# Patient Record
Sex: FEMALE | Race: WHITE
[De-identification: names, ages, dates, MRNs, and addresses within clinical notes are randomized per-mention and may not be internally consistent; named-entity substitution may affect disease eponyms.]

---

## 2018-03-26 ENCOUNTER — HOSPITAL ENCOUNTER (OUTPATIENT)
Dept: HOSPITAL 88 - RAD | Age: 43
End: 2018-03-26
Attending: INTERNAL MEDICINE
Payer: COMMERCIAL

## 2018-03-26 DIAGNOSIS — M25.572: Primary | ICD-10-CM

## 2018-03-26 NOTE — DIAGNOSTIC IMAGING REPORT
PROCEDURE:X-RAY LEFT FOOT, TWO VIEWS

 

COMPARISON:None.

 

INDICATIONS:ONGOING FOOT PAIN

 

FINDINGS:

There are no fractures, dislocations, lytic or blastic lesions. The 

bones are well-mineralized. The soft-tissues are unremarkable. 

Continued enthesophyte at the plantar fascia insertion. Minimal 

enthesophyte at the Achilles tendon insertion.

 

CONCLUSION:

Plantar calcaneal enthesophyte. 

 

Dictated by:  Stas Guadalupe M.D. on 3/26/2018 at 18:01     

Electronically approved by:  Stas Guadalupe M.D. on 3/26/2018 at 18:01